# Patient Record
Sex: MALE | Race: WHITE | NOT HISPANIC OR LATINO | Employment: UNEMPLOYED | URBAN - METROPOLITAN AREA
[De-identification: names, ages, dates, MRNs, and addresses within clinical notes are randomized per-mention and may not be internally consistent; named-entity substitution may affect disease eponyms.]

---

## 2021-04-15 ENCOUNTER — NEW PATIENT COMPREHENSIVE (OUTPATIENT)
Dept: URBAN - METROPOLITAN AREA CLINIC 46 | Facility: CLINIC | Age: 61
End: 2021-04-15

## 2021-04-15 DIAGNOSIS — H52.7: ICD-10-CM

## 2021-04-15 DIAGNOSIS — E10.9: ICD-10-CM

## 2021-04-15 DIAGNOSIS — H25.812: ICD-10-CM

## 2021-04-15 DIAGNOSIS — H25.811: ICD-10-CM

## 2021-04-15 DIAGNOSIS — H04.123: ICD-10-CM

## 2021-04-15 DIAGNOSIS — H17.9: ICD-10-CM

## 2021-04-15 PROCEDURE — 92004 COMPRE OPH EXAM NEW PT 1/>: CPT

## 2021-04-15 PROCEDURE — 92015 DETERMINE REFRACTIVE STATE: CPT

## 2021-04-15 ASSESSMENT — VISUAL ACUITY
OD_SC: CF 4FT
OS_CC: J8
OS_SC: J10 BLURRY
OD_SC: J10 BLURRY
OS_PH: 20/80
OS_CC: 20/200
OD_BAT: 20/400
OD_CC: -J10
OD_CC: 20/200
OS_SC: 20/100
OS_BAT: 20/400

## 2021-04-15 ASSESSMENT — TONOMETRY
OS_IOP_MMHG: 15
OD_IOP_MMHG: 14

## 2021-04-20 ENCOUNTER — CATARACT CONSULT (OUTPATIENT)
Dept: URBAN - METROPOLITAN AREA CLINIC 43 | Facility: CLINIC | Age: 61
End: 2021-04-20

## 2021-04-20 DIAGNOSIS — E10.9: ICD-10-CM

## 2021-04-20 DIAGNOSIS — H25.812: ICD-10-CM

## 2021-04-20 DIAGNOSIS — H17.9: ICD-10-CM

## 2021-04-20 DIAGNOSIS — H25.811: ICD-10-CM

## 2021-04-20 PROCEDURE — 92136TC INTERFEROMETRY - TECHNICAL COMPONENT

## 2021-04-20 PROCEDURE — V2799PMN IMPRIMIS PRED-MOXI-NEPAF 5ML

## 2021-04-20 PROCEDURE — 99214 OFFICE O/P EST MOD 30 MIN: CPT

## 2021-04-20 PROCEDURE — 92286 ANT SGM IMG I&R SPECLR MIC: CPT

## 2021-04-20 PROCEDURE — 92134 CPTRZ OPH DX IMG PST SGM RTA: CPT

## 2021-04-20 PROCEDURE — 92025-3 CORNEAL TOPO, REFUSED

## 2021-04-20 ASSESSMENT — VISUAL ACUITY
OS_SC: J10
OS_SC: 20/100
OD_BAT: 20/400
OS_CC: 20/100
OD_SC: J10
OD_SC: CF 4FT
OS_CC: J8
OS_BAT: 20/400
OD_CC: 20/200
OS_PH: 20/80
OD_CC: J10

## 2021-04-20 ASSESSMENT — TONOMETRY
OD_IOP_MMHG: 13
OS_IOP_MMHG: 14

## 2021-04-30 ENCOUNTER — H&P (OUTPATIENT)
Dept: URBAN - METROPOLITAN AREA SURGERY 14 | Facility: SURGERY | Age: 61
End: 2021-04-30

## 2021-04-30 ENCOUNTER — POST-OP CATARACT (OUTPATIENT)
Dept: URBAN - METROPOLITAN AREA SURGERY 14 | Facility: SURGERY | Age: 61
End: 2021-04-30

## 2021-04-30 ENCOUNTER — SURGERY/PROCEDURE (OUTPATIENT)
Dept: URBAN - METROPOLITAN AREA CLINIC 43 | Facility: CLINIC | Age: 61
End: 2021-04-30

## 2021-04-30 DIAGNOSIS — Z96.1: ICD-10-CM

## 2021-04-30 DIAGNOSIS — H25.811: ICD-10-CM

## 2021-04-30 DIAGNOSIS — H57.03: ICD-10-CM

## 2021-04-30 DIAGNOSIS — E10.9: ICD-10-CM

## 2021-04-30 DIAGNOSIS — H52.7: ICD-10-CM

## 2021-04-30 DIAGNOSIS — H25.812: ICD-10-CM

## 2021-04-30 DIAGNOSIS — H17.9: ICD-10-CM

## 2021-04-30 PROCEDURE — 99211HP H&P OFFICE/OUTPATIENT VISIT, EST

## 2021-04-30 PROCEDURE — 66982 XCAPSL CTRC RMVL CPLX WO ECP: CPT

## 2021-04-30 ASSESSMENT — VISUAL ACUITY: OD_SC: 20/70

## 2021-04-30 ASSESSMENT — TONOMETRY: OD_IOP_MMHG: 9

## 2021-05-03 ENCOUNTER — POST OP/EVAL OF SECOND EYE (OUTPATIENT)
Dept: URBAN - METROPOLITAN AREA CLINIC 46 | Facility: CLINIC | Age: 61
End: 2021-05-03

## 2021-05-03 DIAGNOSIS — E10.9: ICD-10-CM

## 2021-05-03 DIAGNOSIS — H25.812: ICD-10-CM

## 2021-05-03 DIAGNOSIS — H17.9: ICD-10-CM

## 2021-05-03 DIAGNOSIS — Z96.1: ICD-10-CM

## 2021-05-03 PROCEDURE — 99024 POSTOP FOLLOW-UP VISIT: CPT

## 2021-05-03 PROCEDURE — 99213 OFFICE O/P EST LOW 20 MIN: CPT

## 2021-05-03 ASSESSMENT — TONOMETRY
OS_IOP_MMHG: 11
OD_IOP_MMHG: 10

## 2021-05-03 ASSESSMENT — VISUAL ACUITY
OS_PH: 20/80
OS_BAT: 20/400
OS_SC: 20/100
OD_PH: 20/30
OD_SC: 20/50-1

## 2021-05-14 ENCOUNTER — H&P (OUTPATIENT)
Dept: URBAN - METROPOLITAN AREA SURGERY 14 | Facility: SURGERY | Age: 61
End: 2021-05-14

## 2021-05-14 ENCOUNTER — POST-OP CATARACT (OUTPATIENT)
Dept: URBAN - METROPOLITAN AREA CLINIC 39 | Facility: CLINIC | Age: 61
End: 2021-05-14

## 2021-05-14 ENCOUNTER — SURGERY/PROCEDURE (OUTPATIENT)
Dept: URBAN - METROPOLITAN AREA CLINIC 43 | Facility: CLINIC | Age: 61
End: 2021-05-14

## 2021-05-14 DIAGNOSIS — H25.812: ICD-10-CM

## 2021-05-14 DIAGNOSIS — Z96.1: ICD-10-CM

## 2021-05-14 DIAGNOSIS — H57.03: ICD-10-CM

## 2021-05-14 PROCEDURE — 66982 XCAPSL CTRC RMVL CPLX WO ECP: CPT

## 2021-05-14 PROCEDURE — 99211HP H&P OFFICE/OUTPATIENT VISIT, EST

## 2021-05-14 PROCEDURE — 99211T TECH SERVICE

## 2021-05-14 ASSESSMENT — TONOMETRY: OS_IOP_MMHG: 10

## 2021-05-14 ASSESSMENT — VISUAL ACUITY: OS_SC: 20/70

## 2021-05-17 ENCOUNTER — CATARACT POST-OP 1-DAY (OUTPATIENT)
Dept: URBAN - METROPOLITAN AREA CLINIC 46 | Facility: CLINIC | Age: 61
End: 2021-05-17

## 2021-05-17 DIAGNOSIS — Z96.1: ICD-10-CM

## 2021-05-17 PROCEDURE — 99024 POSTOP FOLLOW-UP VISIT: CPT

## 2021-05-17 ASSESSMENT — VISUAL ACUITY
OD_SC: 20/60
OS_SC: 20/60-2
OD_SC: J8
OS_SC: J8

## 2021-05-17 ASSESSMENT — TONOMETRY
OD_IOP_MMHG: 15
OS_IOP_MMHG: 16

## 2021-05-20 ENCOUNTER — EST. PATIENT EMERGENCY (OUTPATIENT)
Dept: URBAN - METROPOLITAN AREA CLINIC 46 | Facility: CLINIC | Age: 61
End: 2021-05-20

## 2021-05-20 DIAGNOSIS — Z96.1: ICD-10-CM

## 2021-05-20 DIAGNOSIS — H57.11: ICD-10-CM

## 2021-05-20 PROCEDURE — 99024 POSTOP FOLLOW-UP VISIT: CPT

## 2021-05-20 RX ORDER — MINERAL OIL 2; 2 MG/.4ML; MG/.4ML: 1 EMULSION OPHTHALMIC

## 2021-05-20 ASSESSMENT — TONOMETRY
OS_IOP_MMHG: 15
OD_IOP_MMHG: 15

## 2021-05-20 ASSESSMENT — VISUAL ACUITY
OS_SC: 20/60-2
OD_SC: J6
OD_PH: 20/25
OS_SC: J6
OS_PH: 20/25
OD_SC: 20/50

## 2021-06-07 ENCOUNTER — POST-OP CATARACT (OUTPATIENT)
Dept: URBAN - METROPOLITAN AREA CLINIC 46 | Facility: CLINIC | Age: 61
End: 2021-06-07

## 2021-06-07 DIAGNOSIS — Z96.1: ICD-10-CM

## 2021-06-07 DIAGNOSIS — H57.11: ICD-10-CM

## 2021-06-07 PROCEDURE — 99024 POSTOP FOLLOW-UP VISIT: CPT

## 2021-06-07 ASSESSMENT — VISUAL ACUITY
OD_SC: 20/50
OD_SC: J5
OS_SC: J5
OS_SC: 20/50

## 2021-06-07 ASSESSMENT — TONOMETRY
OS_IOP_MMHG: 15
OD_IOP_MMHG: 16

## 2021-06-16 NOTE — PATIENT DISCUSSION
All questions answered and procedure discussed. Pt will consider option and call if decides to proceed. Cataract folder given today.

## 2021-07-23 ENCOUNTER — EST. PATIENT EMERGENCY (OUTPATIENT)
Dept: URBAN - METROPOLITAN AREA CLINIC 46 | Facility: CLINIC | Age: 61
End: 2021-07-23

## 2021-07-23 DIAGNOSIS — Z96.1: ICD-10-CM

## 2021-07-23 DIAGNOSIS — E10.3293: ICD-10-CM

## 2021-07-23 DIAGNOSIS — H26.493: ICD-10-CM

## 2021-07-23 DIAGNOSIS — H43.812: ICD-10-CM

## 2021-07-23 PROCEDURE — 92012 INTRM OPH EXAM EST PATIENT: CPT

## 2021-07-23 RX ORDER — BRIMONIDINE TARTRATE 0.25 MG/ML: 1 SOLUTION/ DROPS OPHTHALMIC AS NEEDED

## 2021-07-23 ASSESSMENT — VISUAL ACUITY
OD_SC: 20/80
OS_SC: 20/80+2

## 2021-07-23 ASSESSMENT — TONOMETRY
OS_IOP_MMHG: 11
OD_IOP_MMHG: 12

## 2021-07-25 ENCOUNTER — EST. PATIENT EMERGENCY (OUTPATIENT)
Dept: URBAN - METROPOLITAN AREA CLINIC 43 | Facility: CLINIC | Age: 61
End: 2021-07-25

## 2021-07-25 DIAGNOSIS — H43.812: ICD-10-CM

## 2021-07-25 DIAGNOSIS — H26.493: ICD-10-CM

## 2021-07-25 DIAGNOSIS — E10.3293: ICD-10-CM

## 2021-07-25 PROCEDURE — 99024 POSTOP FOLLOW-UP VISIT: CPT

## 2021-07-25 ASSESSMENT — VISUAL ACUITY
OD_PH: 20/30
OS_SC: 20/70
OD_SC: 20/50

## 2021-07-25 ASSESSMENT — TONOMETRY: OS_IOP_MMHG: 11

## 2023-09-21 ENCOUNTER — COMPREHENSIVE EXAM (OUTPATIENT)
Dept: URBAN - METROPOLITAN AREA CLINIC 46 | Facility: CLINIC | Age: 63
End: 2023-09-21

## 2023-09-21 DIAGNOSIS — H52.7: ICD-10-CM

## 2023-09-21 DIAGNOSIS — H17.9: ICD-10-CM

## 2023-09-21 DIAGNOSIS — H26.493: ICD-10-CM

## 2023-09-21 DIAGNOSIS — E10.3293: ICD-10-CM

## 2023-09-21 DIAGNOSIS — Z96.1: ICD-10-CM

## 2023-09-21 PROCEDURE — 92014 COMPRE OPH EXAM EST PT 1/>: CPT

## 2023-09-21 PROCEDURE — 92015 DETERMINE REFRACTIVE STATE: CPT

## 2023-09-21 ASSESSMENT — VISUAL ACUITY
OS_BAT: 20/70
OD_SC: J>10
OD_BAT: 20/60
OS_SC: J>10
OD_CC: 20/30
OS_CC: J5
OS_SC: 20/30-1
OS_CC: 20/40
OD_CC: J3
OD_SC: 20/50

## 2023-09-21 ASSESSMENT — TONOMETRY
OD_IOP_MMHG: 13
OS_IOP_MMHG: 11

## 2023-11-06 ENCOUNTER — CONSULTATION/EVALUATION (OUTPATIENT)
Dept: URBAN - METROPOLITAN AREA CLINIC 39 | Facility: CLINIC | Age: 63
End: 2023-11-06

## 2023-11-06 ENCOUNTER — SURGERY/PROCEDURE (OUTPATIENT)
Dept: URBAN - METROPOLITAN AREA SURGERY 14 | Facility: SURGERY | Age: 63
End: 2023-11-06

## 2023-11-06 DIAGNOSIS — H26.493: ICD-10-CM

## 2023-11-06 DIAGNOSIS — H17.9: ICD-10-CM

## 2023-11-06 DIAGNOSIS — E10.3293: ICD-10-CM

## 2023-11-06 PROCEDURE — 6682150 YAG CAPSULOTOMY

## 2023-11-06 PROCEDURE — 99214 OFFICE O/P EST MOD 30 MIN: CPT

## 2023-11-06 ASSESSMENT — VISUAL ACUITY
OS_CC: 20/40
OD_CC: J3
OS_BAT: 20/70
OD_SC: J12
OD_CC: 20/40
OS_SC: J12
OS_SC: 20/30
OD_SC: 20/60
OS_CC: J5
OD_BAT: 20/60

## 2023-11-06 ASSESSMENT — TONOMETRY
OS_IOP_MMHG: 12
OD_IOP_MMHG: 12

## 2023-11-15 ENCOUNTER — POST-OP (OUTPATIENT)
Dept: URBAN - METROPOLITAN AREA CLINIC 46 | Facility: CLINIC | Age: 63
End: 2023-11-15

## 2023-11-15 DIAGNOSIS — Z98.890: ICD-10-CM

## 2023-11-15 PROCEDURE — 99024 POSTOP FOLLOW-UP VISIT: CPT

## 2023-11-15 ASSESSMENT — VISUAL ACUITY
OD_SC: 20/50
OU_SC: 20/40
OS_SC: J8
OD_PH: 20/40
OS_SC: 20/40
OD_SC: J8
OU_SC: J6
OS_PH: 20/40

## 2023-11-15 ASSESSMENT — TONOMETRY
OD_IOP_MMHG: 13
OS_IOP_MMHG: 13